# Patient Record
Sex: MALE | Race: WHITE | NOT HISPANIC OR LATINO | Employment: FULL TIME | URBAN - METROPOLITAN AREA
[De-identification: names, ages, dates, MRNs, and addresses within clinical notes are randomized per-mention and may not be internally consistent; named-entity substitution may affect disease eponyms.]

---

## 2019-03-28 ENCOUNTER — TRANSCRIBE ORDERS (OUTPATIENT)
Dept: ADMINISTRATIVE | Facility: HOSPITAL | Age: 29
End: 2019-03-28

## 2019-03-28 DIAGNOSIS — N50.9 DISEASE OF SEMINAL VESICLE: Primary | ICD-10-CM

## 2019-03-29 ENCOUNTER — HOSPITAL ENCOUNTER (OUTPATIENT)
Dept: ULTRASOUND IMAGING | Facility: HOSPITAL | Age: 29
Discharge: HOME | End: 2019-03-29
Payer: COMMERCIAL

## 2019-03-29 DIAGNOSIS — N50.89 SCROTAL MASS: ICD-10-CM

## 2019-03-29 DIAGNOSIS — N50.9 DISEASE OF SEMINAL VESICLE: ICD-10-CM

## 2019-03-29 PROCEDURE — 76870 US EXAM SCROTUM: CPT

## 2020-07-29 ENCOUNTER — APPOINTMENT (EMERGENCY)
Dept: ULTRASOUND IMAGING | Facility: HOSPITAL | Age: 30
End: 2020-07-29
Payer: OTHER MISCELLANEOUS

## 2020-07-29 ENCOUNTER — HOSPITAL ENCOUNTER (EMERGENCY)
Facility: HOSPITAL | Age: 30
Discharge: HOME | End: 2020-07-29
Attending: EMERGENCY MEDICINE | Admitting: EMERGENCY MEDICINE
Payer: OTHER MISCELLANEOUS

## 2020-07-29 VITALS
HEART RATE: 74 BPM | DIASTOLIC BLOOD PRESSURE: 74 MMHG | WEIGHT: 270 LBS | RESPIRATION RATE: 18 BRPM | SYSTOLIC BLOOD PRESSURE: 123 MMHG | TEMPERATURE: 98.2 F | OXYGEN SATURATION: 100 %

## 2020-07-29 DIAGNOSIS — I86.1 LEFT VARICOCELE: ICD-10-CM

## 2020-07-29 DIAGNOSIS — N50.819 PAIN IN TESTICLE DUE TO TRAUMA: Primary | ICD-10-CM

## 2020-07-29 LAB
BILIRUB UR QL STRIP: NEGATIVE
CLARITY UR: CLEAR
COLOR UR: YELLOW
GLUCOSE UR STRIP-MCNC: NEGATIVE MG/DL
HGB UR QL STRIP.AUTO: NEGATIVE
KETONES UR STRIP-MCNC: NEGATIVE MG/DL
LEUKOCYTE ESTERASE UR QL STRIP: NEGATIVE
NITRITE UR QL STRIP: NEGATIVE
PH UR STRIP.AUTO: 6 [PH]
PROT UR STRIP-MCNC: NEGATIVE MG/DL
SP GR UR STRIP.AUTO: >=1.03 (ref 1–1.03)
UROBILINOGEN UR QL STRIP.AUTO: 0.2 E.U./DL

## 2020-07-29 PROCEDURE — 81003 URINALYSIS AUTO W/O SCOPE: CPT | Performed by: EMERGENCY MEDICINE

## 2020-07-29 PROCEDURE — 99284 EMERGENCY DEPT VISIT MOD MDM: CPT | Performed by: EMERGENCY MEDICINE

## 2020-07-29 PROCEDURE — 76870 US EXAM SCROTUM: CPT

## 2020-07-29 PROCEDURE — 99284 EMERGENCY DEPT VISIT MOD MDM: CPT
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
## 2024-02-15 ENCOUNTER — OFFICE VISIT (OUTPATIENT)
Dept: FAMILY MEDICINE CLINIC | Facility: CLINIC | Age: 34
End: 2024-02-15

## 2024-02-15 VITALS
HEIGHT: 68 IN | HEART RATE: 94 BPM | WEIGHT: 189 LBS | RESPIRATION RATE: 18 BRPM | SYSTOLIC BLOOD PRESSURE: 130 MMHG | TEMPERATURE: 98.9 F | BODY MASS INDEX: 28.64 KG/M2 | OXYGEN SATURATION: 97 % | DIASTOLIC BLOOD PRESSURE: 92 MMHG

## 2024-02-15 DIAGNOSIS — F32.A DEPRESSION, UNSPECIFIED DEPRESSION TYPE: Primary | ICD-10-CM

## 2024-02-15 DIAGNOSIS — Z13.9 ENCOUNTER FOR SCREENING INVOLVING SOCIAL DETERMINANTS OF HEALTH (SDOH): ICD-10-CM

## 2024-02-15 PROCEDURE — 99203 OFFICE O/P NEW LOW 30 MIN: CPT | Performed by: FAMILY MEDICINE

## 2024-02-15 RX ORDER — ESCITALOPRAM OXALATE 10 MG/1
10 TABLET ORAL DAILY
Qty: 90 TABLET | Refills: 3 | Status: SHIPPED | OUTPATIENT
Start: 2024-02-15

## 2024-02-18 PROBLEM — Z02.4 ENCOUNTER FOR DRIVER'S LICENSE HISTORY AND PHYSICAL: Status: ACTIVE | Noted: 2024-02-18

## 2024-02-18 PROBLEM — F32.A DEPRESSION: Status: ACTIVE | Noted: 2024-02-18

## 2024-02-19 NOTE — ASSESSMENT & PLAN NOTE
Patient presents for physical examination and evaluation needed for obtaining their  license.     physical exam is within normal limits patient does not have any known history of seizures, syncope, dizziness, any other contraindications to driving.    Patient is educated on the importance of always wearing a seatbelt and reducing distractions while driving which using which includes but is not limited to no texting while driving and never driving under the influence of drugs and/or alcohol.  Patient acknowledges that they understand what was discussed.

## 2024-02-19 NOTE — PROGRESS NOTES
Assessment/Plan:    1. Depression, unspecified depression type  Assessment & Plan:  Currently on Lexapro 10 mg daily.  Continue current medication regimen.  Discussed the possibility of seeing a therapist, however patient currently does not have insurance to see has been laid off of work.  Resources provided to patient and we will follow-up with patient on a monthly basis.  Expressed to patient the importance of having community support and carving out healthy coping mechanisms.    Orders:  -     escitalopram (Lexapro) 10 mg tablet; Take 1 tablet (10 mg total) by mouth daily    2. For social determinants of health  Comments:  Will refer to ambulatory financial services. Will hold off on any Labs for now.         Subjective:      Patient ID: Jadiel Monreal is a 33 y.o. male.    Past medical history notable for anxiety, depression is here to establish care.  Patient wants pertinent medical history includes depression currently on Lexapro.  Patient reports that she has had a couple suicidal attempts in the past, however that was when he was not on his medication.  Patient currently denies any suicidal ideation.  He denies any homicidal ideation.  He denies any visual or auditory hallucinations.        The following portions of the patient's history were reviewed and updated as appropriate: allergies, current medications, past family history, past medical history, past social history, past surgical history, and problem list.    Review of Systems   Constitutional:  Negative for chills and fever.   HENT:  Negative for ear pain and sore throat.    Eyes:  Negative for pain and visual disturbance.   Respiratory:  Negative for cough and shortness of breath.    Cardiovascular:  Negative for chest pain and palpitations.   Gastrointestinal:  Negative for abdominal pain and vomiting.   Genitourinary:  Negative for dysuria and hematuria.   Musculoskeletal:  Negative for arthralgias and back pain.   Skin:  Negative for color  "change and rash.   Neurological:  Negative for seizures and syncope.   All other systems reviewed and are negative.        Objective:      /92 (BP Location: Left arm, Patient Position: Sitting, Cuff Size: Standard)   Pulse 94   Temp 98.9 °F (37.2 °C) (Temporal)   Resp 18   Ht 5' 8\" (1.727 m)   Wt 85.7 kg (189 lb)   SpO2 97%   BMI 28.74 kg/m²          Physical Exam  Constitutional:       General: He is not in acute distress.     Appearance: Normal appearance. He is not toxic-appearing or diaphoretic.   HENT:      Head: Normocephalic.      Mouth/Throat:      Mouth: Mucous membranes are moist.   Eyes:      Extraocular Movements: Extraocular movements intact.      Pupils: Pupils are equal, round, and reactive to light.   Cardiovascular:      Rate and Rhythm: Normal rate and regular rhythm.   Pulmonary:      Effort: Pulmonary effort is normal. No tachypnea.      Breath sounds: Normal breath sounds. No wheezing or rales.   Chest:      Chest wall: No edema. There is no dullness to percussion.   Abdominal:      General: Abdomen is flat. There is no distension.      Palpations: Abdomen is soft.      Tenderness: There is no abdominal tenderness.   Musculoskeletal:      Right lower leg: No edema.      Left lower leg: No edema.   Skin:     Capillary Refill: Capillary refill takes less than 2 seconds.   Neurological:      General: No focal deficit present.      Mental Status: He is alert and oriented to person, place, and time.   Psychiatric:         Mood and Affect: Mood normal.         Behavior: Behavior normal.           Rajan Osman DO   Family Medicine PGY-3  2/18/2024       "

## 2024-02-19 NOTE — ASSESSMENT & PLAN NOTE
Currently on Lexapro 10 mg daily.  Continue current medication regimen.  Discussed the possibility of seeing a therapist, however patient currently does not have insurance to see has been laid off of work.  Resources provided to patient and we will follow-up with patient on a monthly basis.  Expressed to patient the importance of having community support and carving out healthy coping mechanisms.

## 2024-02-21 PROBLEM — Z02.4 ENCOUNTER FOR DRIVER'S LICENSE HISTORY AND PHYSICAL: Status: RESOLVED | Noted: 2024-02-18 | Resolved: 2024-02-21

## 2024-10-14 ENCOUNTER — APPOINTMENT (OUTPATIENT)
Dept: URGENT CARE | Age: 34
End: 2024-10-14

## 2025-03-13 ENCOUNTER — TELEPHONE (OUTPATIENT)
Dept: FAMILY MEDICINE CLINIC | Facility: CLINIC | Age: 35
End: 2025-03-13

## 2025-03-13 NOTE — TELEPHONE ENCOUNTER
Pt was contacted in regards to scheduling a follow up appointment to establish with a new PCP. Patient stated he is currently unable to schedule appointment and will contact office back when he can. Letter will be sent for patient to contact office back within 30 days before routing to [facility].